# Patient Record
(demographics unavailable — no encounter records)

---

## 2025-07-21 NOTE — REVIEW OF SYSTEMS
[Joint Pain] : joint pain [Joint Stiffness] : joint stiffness [Negative] : Constitutional [de-identified] : lower extremity parasthesias

## 2025-07-21 NOTE — IMAGING
[de-identified] : Lumbar spine: 5 out of 5 strength, 2+ reflexes, pain and decreased range of motion with flexion, positive straight leg raise bilaterally 45 degrees, positive tense palpation bilateral lumbar paraspinal muscles.  X-ray lumbar spine 4 views: Degenerative changes noted, loss of disc height at L4-L5 and L5-S1.

## 2025-07-21 NOTE — ASSESSMENT
[FreeTextEntry1] : 45-year-old male with lumbar degenerative disc changes, lumbar radiculopathy.  I will have him undergo physical therapy and provided with a prescription to do so.  He will follow-up in 6 weeks for reassessment if there is any question we will contact the office and he verbalized understanding and agreement.

## 2025-07-21 NOTE — HISTORY OF PRESENT ILLNESS
[de-identified] : Mr. Mohan is a 45-year-old male who presents to the office for evaluation of lower back pain that has been ongoing for years at this point.  He states it is progressively getting worse and he states it does radiate down his legs at times with some burning.  He is not taking any type of medication as he does not feel over-the-counter medication is effective.  States the pain is worse when he bends for prolonged periods of time.  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